# Patient Record
Sex: MALE | Race: WHITE | Employment: FULL TIME
[De-identification: names, ages, dates, MRNs, and addresses within clinical notes are randomized per-mention and may not be internally consistent; named-entity substitution may affect disease eponyms.]

---

## 2022-10-04 ENCOUNTER — NURSE TRIAGE (OUTPATIENT)
Dept: OTHER | Facility: CLINIC | Age: 42
End: 2022-10-04

## 2022-10-04 NOTE — TELEPHONE ENCOUNTER
Received call from [address] at [facility] with Red Flag Complaint. Subjective: Caller states \"I have a cyst on the back left of my neck that has ruptured. It is swollen around the cyst.  I t did have some pus\"     Current Symptoms: ruptured cyst, redness and swollen around where cyst was. Had to be referred to Dermatolologist the last time, but that one had not ruptured. Onset: 1 week ago; worsening    Associated Symptoms: NA    Pain Severity: 1/-410; burning; constant, waxing and waning    Temperature: none     What has been tried: bandage    LMP: NA Pregnant: NA    Recommended disposition: See in Office Today    Care advice provided, patient verbalizes understanding; denies any other questions or concerns; instructed to call back for any new or worsening symptoms. Patient/Caller agrees with recommended disposition; writer provided warm transfer to [facility] at [facility] for appointment scheduling. Attention Provider: Thank you for allowing me to participate in the care of your patient. The patient was connected to triage in response to information provided to the ECC/PSC. Please do not respond through this encounter as the response is not directed to a shared pool.       Reason for Disposition   Swelling is painful to touch and no fever    Protocols used: Skin Lump or Localized Swelling-ADULT-[place]

## 2022-10-11 ENCOUNTER — OFFICE VISIT (OUTPATIENT)
Dept: FAMILY MEDICINE CLINIC | Facility: CLINIC | Age: 42
End: 2022-10-11
Payer: COMMERCIAL

## 2022-10-11 VITALS
TEMPERATURE: 98.7 F | BODY MASS INDEX: 24.32 KG/M2 | WEIGHT: 206 LBS | HEIGHT: 77 IN | OXYGEN SATURATION: 97 % | DIASTOLIC BLOOD PRESSURE: 71 MMHG | RESPIRATION RATE: 16 BRPM | HEART RATE: 75 BPM | SYSTOLIC BLOOD PRESSURE: 116 MMHG

## 2022-10-11 DIAGNOSIS — L02.11 ABSCESS OF SKIN OF NECK: Primary | ICD-10-CM

## 2022-10-11 PROCEDURE — 99212 OFFICE O/P EST SF 10 MIN: CPT | Performed by: FAMILY MEDICINE

## 2022-10-11 PROCEDURE — 10060 I&D ABSCESS SIMPLE/SINGLE: CPT | Performed by: FAMILY MEDICINE

## 2022-10-11 ASSESSMENT — PATIENT HEALTH QUESTIONNAIRE - PHQ9
SUM OF ALL RESPONSES TO PHQ QUESTIONS 1-9: 0
SUM OF ALL RESPONSES TO PHQ9 QUESTIONS 1 & 2: 0
2. FEELING DOWN, DEPRESSED OR HOPELESS: 0
SUM OF ALL RESPONSES TO PHQ QUESTIONS 1-9: 0
1. LITTLE INTEREST OR PLEASURE IN DOING THINGS: 0

## 2022-10-11 ASSESSMENT — ENCOUNTER SYMPTOMS
COUGH: 0
SHORTNESS OF BREATH: 0
VOMITING: 0
NAIL CHANGES: 0
DIARRHEA: 0
RHINORRHEA: 0
EYE PAIN: 0
SORE THROAT: 0

## 2022-10-11 NOTE — PROGRESS NOTES
HISTORY OF PRESENT ILLNESS  Chief Complaint   Patient presents with    Cyst     On the back of his neck on the left side; pt states last week it became super inflamed about the size of a quarter and opened after a shower and had cottage cheese type discharge. PT states that he has a history of these cysts. Now is the size of a Large pee and is still painful     On the back of his neck on the left side; pt states last week it became super inflamed about the size of a quarter and opened after a shower and had cottage cheese type discharge. PT states that he has a history of these cysts. Now is the size of a Large pee and is still painful  A little tender to touch but just a knot that is tender under the skin    Previously said, \" Patient presents to office with a cyst on the back of his neck, been present for a month now is sore and swollen, no drainage. \"      Skin Problem  This is a recurrent problem. The current episode started 1 to 4 weeks ago. The affected locations include the neck. Pertinent negatives include no anorexia, congestion, cough, diarrhea, eye pain, facial edema, fatigue, fever, joint pain, nail changes, rhinorrhea, shortness of breath, sore throat or vomiting. Past treatments include analgesics. Review of Systems   Constitutional:  Negative for fatigue and fever. HENT:  Negative for congestion, rhinorrhea and sore throat. Eyes:  Negative for pain. Respiratory:  Negative for cough and shortness of breath. Gastrointestinal:  Negative for anorexia, diarrhea and vomiting. Musculoskeletal:  Negative for joint pain. Skin:  Negative for nail changes. Patient Active Problem List   Diagnosis    Ureteral calculus         Blood pressure 116/71, pulse 75, temperature 98.7 °F (37.1 °C), temperature source Temporal, resp. rate 16, height 6' 4.5\" (1.943 m), weight 206 lb (93.4 kg), SpO2 97 %. Pain Scale: 0 - No pain/10 Pain Location:   Body mass index is 24.75 kg/m². Physical Exam  Vitals and nursing note reviewed. Constitutional:       General: He is not in acute distress. Appearance: He is normal weight. He is not toxic-appearing. HENT:      Head: Normocephalic. Eyes:      General: Lids are normal.      Extraocular Movements: Extraocular movements intact. Conjunctiva/sclera: Conjunctivae normal.      Pupils: Pupils are equal.   Cardiovascular:      Rate and Rhythm: Normal rate and regular rhythm. Heart sounds: Normal heart sounds. No murmur heard. No friction rub. No gallop. Pulmonary:      Effort: Pulmonary effort is normal. No respiratory distress. Breath sounds: Normal breath sounds. No stridor. No wheezing, rhonchi or rales. Chest:      Chest wall: No tenderness. Skin:     General: Skin is warm and dry. Capillary Refill: Capillary refill takes less than 2 seconds. Comments: Left posterior neck cyst with some surrounding erythema. Neurological:      General: No focal deficit present. Mental Status: He is alert and oriented to person, place, and time. Psychiatric:         Mood and Affect: Mood normal.         Behavior: Behavior normal.         Thought Content: Thought content normal.         Judgment: Judgment normal.        Incision and Drainage Note    The procedure was thoroughly explained. Consent was signed and placed on the patient's chart. The appropriate area of the left neck was localized, prepped and draped in usual sterile fashion. Vapocoolant spray was used to numb the area. Anesthesia was with 1% lidocaine w/ bicarbonate totaling approximately 2 ml. The area was opened with a #11 blade resulting in drainage of purulent material.  Wound was gently explored with a hemostat with further expression of more pus. The surrounding area was inspected for more consolidation. Iodophore packing was placed in the wound. The bleeding was easily controlled. The patient tolerated well.   Wound was dressed with abx ointment and 4x4 gauze. Wound instructions were given. The patient will notify us of any concerns or complications. The patient was instructed to pull out 1 inch a day and trim. ASSESSMENT and PLAN   Diagnosis Orders   1. Abscess of skin of neck  DRAIN SKIN ABSCESS SIMPLE          Reviewed medications and side effects in detail      His other chronic conditions are stable at this time. He is stable on the current treatment and tolerating it well. No significant sides effects. Will not adjust therapy at this time, unless noted above. We will continue to monitor for any problems. Medications refilled and lab work has been ordered where needed. Reviewed medications are explained including any potential interactions or side effects in detail. The patient's questions were answered. He  understands the above and has no further questions. Further workup and treatment should be done if symptoms persist, worsen or new symptoms occur. He  will call to notify us of any problems, complications or worsening symptoms. There are no Patient Instructions on file for this visit. (Some details in this note may have been created with speech-recognition software. Some errors in speech recognition may have occurred.    Most of those have been corrected but some may have been missed.)         Ivette eWems MD  [address] [facility] [place] [facility]  Phone [phone]

## 2022-10-11 NOTE — PATIENT INSTRUCTIONS
Wound Care Instructions    The way we care for wounds now has drastically changed in the past 10-15 years. Please read the following instructions carefully. Care for your wound by cleaning it daily with gentle soap and water. Do not use peroxide or betadine for routine cleaning of wound. These solutions will kill bacteria but will kill fragile new skin cells that are growing to heal the wound. After cleaning wound, apply antibiotic ointment or Vaseline to the wound. The goal is to keep the wound MOIST  - not dry. A bandage or dressing of some sort may be used over the ointment to help seal in the moisture. Change bandage twice a day if possible. If a covering is not possible reapply ointment frequently. Again, do NOT allow the wound to air out or dry. A scab is actually harmful to the wound healing process. It is the bodys way of sealing in moisture. Watch closely for any signs of infection. These include fever or increasing pain, redness, swelling, pus, odors, or red streaks. Return immediately for numbness, loss of color, or inability to move joint below the wound. Notify us at the above telephone number for any concerns or problems. Eat a good healthy diet. Take a multivitamin each day with a meal.  Consider adding a vitamin C tablet later in the day with a glass of water. If we had to open up a pocket of pus:  Pull out 1 inch of the antibiotic tape each day and trim it off. If it all comes out use peroxide on a cotton swab to keep wound open so it will drain. You can keep your bandage on until after a shower and then change it when it is wet and it will not stick as bad.